# Patient Record
Sex: FEMALE | Race: WHITE | NOT HISPANIC OR LATINO | Employment: OTHER | URBAN - METROPOLITAN AREA
[De-identification: names, ages, dates, MRNs, and addresses within clinical notes are randomized per-mention and may not be internally consistent; named-entity substitution may affect disease eponyms.]

---

## 2024-07-11 NOTE — PROGRESS NOTES
Subjective   Patient ID: Cristina Keating is a 59 y.o. female who presents for Annual Exam.    PAP 6-13-22 NEG, HPV NEG  6/18/18 NEG HPV NEG, 5/4/15 NEG HPV NEG, 5-14-15 NEG HPV NEG, 4-25-14 NL, 4/13 nl, 4/12 ASC neg, 4/11 nl, 4/10 ASC neg, 9/09 ASC pos, 5/09 Colpo neg, 3/09 LSIL  MAMMO 7-19-23  DEXA NEVER  COLON 5-2024, few polyps, 5 years.     Has had both carpal tunnels done. Hot flashes started again in January day and night. Few drinks a night.      G0 Use long spec.  No VB. No issues with vaginal dryness.     Exercises well  Retired now(), planning to move to new house. House on the market.  , not sexually active.  diabetes and issues.     Taking vit D/calcium. On Spironolactone for hirsuitism. Stopped in February.  Exercises every day. Walks and cardio boxing, weights. Should issue. Gaining weight.      Review of Systems   Genitourinary:         Hot flashes.   All other systems reviewed and are negative.      Objective   Constitutional: Alert and in no acute distress. Well developed, well nourished.  Neck: no neck asymmetry. Supple and thyroid not enlarged and there were no palpable thyroid nodules.  Chest: Breasts normal appearance, no nipple discharge and no skin changes and palpation of breasts and axillae: no palpable mass and no axillary lymphadenopathy.  Abdomen: soft nontender; no abdominal mass palpated, no organomegaly and no hernias.  Genitourinary: external genitalia: normal, no inguinal lymphadenopathy, Bartholin's urethral and Aromas's glands: normal, urethra: normal and bladder: normal on palpation.  Vagina: normal.  Cervix: normal.  Uterus: normal.   Right adnexa/parametria: normal.  Left adnexa/parametria: normal.  Skin: normal skin color and pigmentation, normal skin turgor and no rash.  Psychiatric: alert and oriented x 3, affect normal to patient baseline and mood appropriate.     Assessment/Plan   -no pap  -archana-damaso  -Follow up PCP thyroid. Weight gain.  -Discussed options  for menopausal symptoms, declines now.

## 2024-07-12 ENCOUNTER — APPOINTMENT (OUTPATIENT)
Dept: OBSTETRICS AND GYNECOLOGY | Facility: CLINIC | Age: 59
End: 2024-07-12
Payer: MEDICAID

## 2024-07-12 VITALS
HEIGHT: 66 IN | BODY MASS INDEX: 30.67 KG/M2 | WEIGHT: 190.8 LBS | SYSTOLIC BLOOD PRESSURE: 136 MMHG | DIASTOLIC BLOOD PRESSURE: 82 MMHG

## 2024-07-12 DIAGNOSIS — Z12.31 ENCOUNTER FOR SCREENING MAMMOGRAM FOR MALIGNANT NEOPLASM OF BREAST: Primary | ICD-10-CM

## 2024-07-12 DIAGNOSIS — Z01.419 WELL WOMAN EXAM WITH ROUTINE GYNECOLOGICAL EXAM: ICD-10-CM

## 2024-07-12 PROCEDURE — 99396 PREV VISIT EST AGE 40-64: CPT | Performed by: OBSTETRICS & GYNECOLOGY

## 2024-07-12 PROCEDURE — 1036F TOBACCO NON-USER: CPT | Performed by: OBSTETRICS & GYNECOLOGY

## 2024-07-12 RX ORDER — FLUOXETINE HYDROCHLORIDE 20 MG/1
20 CAPSULE ORAL
COMMUNITY
Start: 2024-07-02

## 2024-07-12 RX ORDER — CHOLECALCIFEROL (VITAMIN D3) 50 MCG
TABLET ORAL
COMMUNITY

## 2024-07-12 RX ORDER — SUMATRIPTAN 50 MG/1
TABLET, FILM COATED ORAL
COMMUNITY

## 2024-07-12 RX ORDER — AMLODIPINE BESYLATE 5 MG/1
TABLET ORAL
COMMUNITY
Start: 2022-06-02

## 2024-07-12 RX ORDER — LEVOTHYROXINE SODIUM 112 UG/1
TABLET ORAL
COMMUNITY
Start: 2012-12-07

## 2024-07-12 RX ORDER — ATORVASTATIN CALCIUM 20 MG/1
1.5 TABLET, FILM COATED ORAL DAILY
COMMUNITY
Start: 2022-03-21

## 2024-07-12 RX ORDER — CELECOXIB 100 MG/1
100 CAPSULE ORAL 2 TIMES DAILY
COMMUNITY
Start: 2024-07-02 | End: 2025-07-02

## 2024-07-12 RX ORDER — CALCIUM CARBONATE/VITAMIN D3 600MG-5MCG
TABLET ORAL
COMMUNITY

## 2024-07-23 ENCOUNTER — APPOINTMENT (OUTPATIENT)
Dept: RADIOLOGY | Facility: CLINIC | Age: 59
End: 2024-07-23
Payer: MEDICAID

## 2024-07-30 ENCOUNTER — HOSPITAL ENCOUNTER (OUTPATIENT)
Dept: RADIOLOGY | Facility: CLINIC | Age: 59
Discharge: HOME | End: 2024-07-30
Payer: MEDICAID

## 2024-07-30 VITALS — BODY MASS INDEX: 30.53 KG/M2 | HEIGHT: 66 IN | WEIGHT: 190 LBS

## 2024-07-30 DIAGNOSIS — Z12.31 ENCOUNTER FOR SCREENING MAMMOGRAM FOR MALIGNANT NEOPLASM OF BREAST: ICD-10-CM

## 2024-07-30 PROCEDURE — 77067 SCR MAMMO BI INCL CAD: CPT | Performed by: STUDENT IN AN ORGANIZED HEALTH CARE EDUCATION/TRAINING PROGRAM

## 2024-07-30 PROCEDURE — 77067 SCR MAMMO BI INCL CAD: CPT

## 2024-07-30 PROCEDURE — 77063 BREAST TOMOSYNTHESIS BI: CPT | Performed by: STUDENT IN AN ORGANIZED HEALTH CARE EDUCATION/TRAINING PROGRAM

## 2025-07-17 NOTE — PROGRESS NOTES
Subjective   Patient ID: Cristina Keating is a 60 y.o. female who presents for Annual Exam.    PAP 6-13-22 NEG, HPV NEG  6/18/18 NEG HPV NEG, 5/4/15 NEG HPV NEG, 5-14-15 NEG HPV NEG, 4-25-14 NL, 4/13 nl, 4/12 ASC neg, 4/11 nl, 4/10 ASC neg, 9/09 ASC pos, 5/09 Colpo neg, 3/09 LSIL  MAMMO 7-30-24  DEXA NEVER pgm osteo but no hip fx.  COLON 5-2024, few polyps, 5 years.     Went on Zepbound and lost a lot of weight. Finished in December. Has been able to keep off. Exercising almost daily.    Has had both carpal tunnels done. Hot flashes better with weight loss, no issues.     G0 Use long spec.  No VB. No issues with vaginal dryness. Stopped periods at 50.  Exercising, vit D.     Exercises well  Retired now(), planning to move to new house. Sold house, in an RV. Bought a house in [place]. [facility].   , not sexually active.  diabetes and issues.     Taking vit D/calcium. Stopped Spironolactone.  Exercises every day. Walks and cardio boxing, weights. Should issue. Gaining weight.      Review of Systems   All other systems reviewed and are negative.      Objective   Constitutional: Alert and in no acute distress. Well developed, well nourished.  Neck: no neck asymmetry. Supple and thyroid not enlarged and there were no palpable thyroid nodules.  Chest: Breasts normal appearance, no nipple discharge and no skin changes and palpation of breasts and axillae: no palpable mass and no axillary lymphadenopathy.  Abdomen: soft nontender; no abdominal mass palpated, no organomegaly and no hernias.  Genitourinary: external genitalia: normal, no inguinal lymphadenopathy, Bartholin's urethral and Eek's glands: normal, urethra: normal and bladder: normal on palpation.  Vagina: normal.  Cervix: normal.  Uterus: normal.   Right adnexa/parametria: normal.  Left adnexa/parametria: normal.  Skin: normal skin color and pigmentation, normal skin turgor and no rash.  Psychiatric: alert and oriented x 3, affect normal to patient  baseline and mood appropriate.     Assessment/Plan   -no pap  -archana-damaso  -DEXA, baseline, FH, earlier menopause.

## 2025-07-21 ENCOUNTER — APPOINTMENT (OUTPATIENT)
Dept: OBSTETRICS AND GYNECOLOGY | Facility: CLINIC | Age: 60
End: 2025-07-21
Payer: MEDICAID

## 2025-07-21 VITALS
SYSTOLIC BLOOD PRESSURE: 126 MMHG | DIASTOLIC BLOOD PRESSURE: 82 MMHG | BODY MASS INDEX: 26.68 KG/M2 | WEIGHT: 166 LBS | HEIGHT: 66 IN

## 2025-07-21 DIAGNOSIS — Z13.820 SCREENING FOR OSTEOPOROSIS: ICD-10-CM

## 2025-07-21 DIAGNOSIS — Z12.31 ENCOUNTER FOR SCREENING MAMMOGRAM FOR BREAST CANCER: Primary | ICD-10-CM

## 2025-07-21 DIAGNOSIS — Z78.0 ASYMPTOMATIC POSTMENOPAUSAL STATE: ICD-10-CM

## 2025-07-21 PROCEDURE — 99396 PREV VISIT EST AGE 40-64: CPT | Performed by: OBSTETRICS & GYNECOLOGY

## 2025-07-21 PROCEDURE — 3008F BODY MASS INDEX DOCD: CPT | Performed by: OBSTETRICS & GYNECOLOGY

## 2025-07-21 PROCEDURE — 1036F TOBACCO NON-USER: CPT | Performed by: OBSTETRICS & GYNECOLOGY

## 2025-07-21 RX ORDER — OMEGA-3-ACID ETHYL ESTERS 1 G/1
CAPSULE, LIQUID FILLED ORAL
COMMUNITY

## 2025-07-21 RX ORDER — AMLODIPINE BESYLATE 10 MG/1
TABLET ORAL
COMMUNITY
Start: 2025-05-29

## 2025-07-21 RX ORDER — PSYLLIUM HUSK 0.4 G
CAPSULE ORAL
COMMUNITY

## 2025-07-24 ENCOUNTER — HOSPITAL ENCOUNTER (OUTPATIENT)
Dept: RADIOLOGY | Facility: CLINIC | Age: 60
Discharge: HOME | End: 2025-07-24
Payer: MEDICAID

## 2025-07-24 DIAGNOSIS — Z13.820 SCREENING FOR OSTEOPOROSIS: ICD-10-CM

## 2025-07-24 DIAGNOSIS — Z78.0 ASYMPTOMATIC POSTMENOPAUSAL STATE: ICD-10-CM

## 2025-07-24 PROCEDURE — 77080 DXA BONE DENSITY AXIAL: CPT

## 2025-07-24 PROCEDURE — 77080 DXA BONE DENSITY AXIAL: CPT | Performed by: RADIOLOGY

## 2025-07-31 ENCOUNTER — HOSPITAL ENCOUNTER (OUTPATIENT)
Dept: RADIOLOGY | Facility: CLINIC | Age: 60
Discharge: HOME | End: 2025-07-31
Payer: MEDICAID

## 2025-07-31 DIAGNOSIS — Z12.31 ENCOUNTER FOR SCREENING MAMMOGRAM FOR BREAST CANCER: ICD-10-CM

## 2025-07-31 PROCEDURE — 77063 BREAST TOMOSYNTHESIS BI: CPT
